# Patient Record
Sex: MALE | Race: WHITE | URBAN - METROPOLITAN AREA
[De-identification: names, ages, dates, MRNs, and addresses within clinical notes are randomized per-mention and may not be internally consistent; named-entity substitution may affect disease eponyms.]

---

## 2023-05-12 ENCOUNTER — INPATIENT (INPATIENT)
Facility: HOSPITAL | Age: 63
LOS: 0 days | Discharge: HOME | DRG: 247 | End: 2023-05-13
Attending: STUDENT IN AN ORGANIZED HEALTH CARE EDUCATION/TRAINING PROGRAM | Admitting: HOSPITALIST
Payer: COMMERCIAL

## 2023-05-12 VITALS
WEIGHT: 194.01 LBS | HEART RATE: 65 BPM | SYSTOLIC BLOOD PRESSURE: 182 MMHG | OXYGEN SATURATION: 99 % | TEMPERATURE: 98 F | RESPIRATION RATE: 19 BRPM | DIASTOLIC BLOOD PRESSURE: 102 MMHG

## 2023-05-12 DIAGNOSIS — I21.4 NON-ST ELEVATION (NSTEMI) MYOCARDIAL INFARCTION: ICD-10-CM

## 2023-05-12 LAB
ALBUMIN SERPL ELPH-MCNC: 4.6 G/DL — SIGNIFICANT CHANGE UP (ref 3.5–5.2)
ALP SERPL-CCNC: 51 U/L — SIGNIFICANT CHANGE UP (ref 30–115)
ALT FLD-CCNC: 22 U/L — SIGNIFICANT CHANGE UP (ref 0–41)
ANION GAP SERPL CALC-SCNC: 11 MMOL/L — SIGNIFICANT CHANGE UP (ref 7–14)
APTT BLD: 33.2 SEC — SIGNIFICANT CHANGE UP (ref 27–39.2)
AST SERPL-CCNC: 20 U/L — SIGNIFICANT CHANGE UP (ref 0–41)
BASOPHILS # BLD AUTO: 0.02 K/UL — SIGNIFICANT CHANGE UP (ref 0–0.2)
BASOPHILS NFR BLD AUTO: 0.3 % — SIGNIFICANT CHANGE UP (ref 0–1)
BILIRUB SERPL-MCNC: 0.5 MG/DL — SIGNIFICANT CHANGE UP (ref 0.2–1.2)
BLD GP AB SCN SERPL QL: SIGNIFICANT CHANGE UP
BUN SERPL-MCNC: 22 MG/DL — HIGH (ref 10–20)
CALCIUM SERPL-MCNC: 10.3 MG/DL — SIGNIFICANT CHANGE UP (ref 8.4–10.5)
CHLORIDE SERPL-SCNC: 102 MMOL/L — SIGNIFICANT CHANGE UP (ref 98–110)
CO2 SERPL-SCNC: 27 MMOL/L — SIGNIFICANT CHANGE UP (ref 17–32)
CREAT SERPL-MCNC: 1 MG/DL — SIGNIFICANT CHANGE UP (ref 0.7–1.5)
EGFR: 86 ML/MIN/1.73M2 — SIGNIFICANT CHANGE UP
EOSINOPHIL # BLD AUTO: 0.08 K/UL — SIGNIFICANT CHANGE UP (ref 0–0.7)
EOSINOPHIL NFR BLD AUTO: 1.3 % — SIGNIFICANT CHANGE UP (ref 0–8)
GLUCOSE SERPL-MCNC: 110 MG/DL — HIGH (ref 70–99)
HCT VFR BLD CALC: 45.5 % — SIGNIFICANT CHANGE UP (ref 42–52)
HGB BLD-MCNC: 15.3 G/DL — SIGNIFICANT CHANGE UP (ref 14–18)
IMM GRANULOCYTES NFR BLD AUTO: 0.3 % — SIGNIFICANT CHANGE UP (ref 0.1–0.3)
INR BLD: 0.93 RATIO — SIGNIFICANT CHANGE UP (ref 0.65–1.3)
LIDOCAIN IGE QN: 26 U/L — SIGNIFICANT CHANGE UP (ref 7–60)
LYMPHOCYTES # BLD AUTO: 0.98 K/UL — LOW (ref 1.2–3.4)
LYMPHOCYTES # BLD AUTO: 16.3 % — LOW (ref 20.5–51.1)
MAGNESIUM SERPL-MCNC: 2.2 MG/DL — SIGNIFICANT CHANGE UP (ref 1.8–2.4)
MCHC RBC-ENTMCNC: 31.5 PG — HIGH (ref 27–31)
MCHC RBC-ENTMCNC: 33.6 G/DL — SIGNIFICANT CHANGE UP (ref 32–37)
MCV RBC AUTO: 93.6 FL — SIGNIFICANT CHANGE UP (ref 80–94)
MONOCYTES # BLD AUTO: 0.45 K/UL — SIGNIFICANT CHANGE UP (ref 0.1–0.6)
MONOCYTES NFR BLD AUTO: 7.5 % — SIGNIFICANT CHANGE UP (ref 1.7–9.3)
NEUTROPHILS # BLD AUTO: 4.45 K/UL — SIGNIFICANT CHANGE UP (ref 1.4–6.5)
NEUTROPHILS NFR BLD AUTO: 74.3 % — SIGNIFICANT CHANGE UP (ref 42.2–75.2)
NRBC # BLD: 0 /100 WBCS — SIGNIFICANT CHANGE UP (ref 0–0)
PLATELET # BLD AUTO: 240 K/UL — SIGNIFICANT CHANGE UP (ref 130–400)
PMV BLD: 10.7 FL — HIGH (ref 7.4–10.4)
POTASSIUM SERPL-MCNC: 5.4 MMOL/L — HIGH (ref 3.5–5)
POTASSIUM SERPL-SCNC: 5.4 MMOL/L — HIGH (ref 3.5–5)
PROT SERPL-MCNC: 7.2 G/DL — SIGNIFICANT CHANGE UP (ref 6–8)
PROTHROM AB SERPL-ACNC: 10.6 SEC — SIGNIFICANT CHANGE UP (ref 9.95–12.87)
RBC # BLD: 4.86 M/UL — SIGNIFICANT CHANGE UP (ref 4.7–6.1)
RBC # FLD: 12 % — SIGNIFICANT CHANGE UP (ref 11.5–14.5)
SODIUM SERPL-SCNC: 140 MMOL/L — SIGNIFICANT CHANGE UP (ref 135–146)
TROPONIN T SERPL-MCNC: 0.03 NG/ML — CRITICAL HIGH
TROPONIN T SERPL-MCNC: 0.07 NG/ML — CRITICAL HIGH
WBC # BLD: 6 K/UL — SIGNIFICANT CHANGE UP (ref 4.8–10.8)
WBC # FLD AUTO: 6 K/UL — SIGNIFICANT CHANGE UP (ref 4.8–10.8)

## 2023-05-12 PROCEDURE — 83735 ASSAY OF MAGNESIUM: CPT

## 2023-05-12 PROCEDURE — 93458 L HRT ARTERY/VENTRICLE ANGIO: CPT | Mod: 59

## 2023-05-12 PROCEDURE — 85025 COMPLETE CBC W/AUTO DIFF WBC: CPT

## 2023-05-12 PROCEDURE — 80053 COMPREHEN METABOLIC PANEL: CPT

## 2023-05-12 PROCEDURE — 99285 EMERGENCY DEPT VISIT HI MDM: CPT

## 2023-05-12 PROCEDURE — C1769: CPT

## 2023-05-12 PROCEDURE — C1725: CPT

## 2023-05-12 PROCEDURE — C1761: CPT

## 2023-05-12 PROCEDURE — 93306 TTE W/DOPPLER COMPLETE: CPT

## 2023-05-12 PROCEDURE — 92978 ENDOLUMINL IVUS OCT C 1ST: CPT | Mod: LD

## 2023-05-12 PROCEDURE — 93005 ELECTROCARDIOGRAM TRACING: CPT

## 2023-05-12 PROCEDURE — 85730 THROMBOPLASTIN TIME PARTIAL: CPT

## 2023-05-12 PROCEDURE — 86803 HEPATITIS C AB TEST: CPT

## 2023-05-12 PROCEDURE — 93010 ELECTROCARDIOGRAM REPORT: CPT | Mod: 77

## 2023-05-12 PROCEDURE — 86850 RBC ANTIBODY SCREEN: CPT

## 2023-05-12 PROCEDURE — C9600: CPT | Mod: RC

## 2023-05-12 PROCEDURE — 0715T: CPT

## 2023-05-12 PROCEDURE — 71045 X-RAY EXAM CHEST 1 VIEW: CPT | Mod: 26

## 2023-05-12 PROCEDURE — 84443 ASSAY THYROID STIM HORMONE: CPT

## 2023-05-12 PROCEDURE — C1874: CPT

## 2023-05-12 PROCEDURE — 36415 COLL VENOUS BLD VENIPUNCTURE: CPT

## 2023-05-12 PROCEDURE — C1887: CPT

## 2023-05-12 PROCEDURE — 86901 BLOOD TYPING SEROLOGIC RH(D): CPT

## 2023-05-12 PROCEDURE — C1753: CPT

## 2023-05-12 PROCEDURE — 83036 HEMOGLOBIN GLYCOSYLATED A1C: CPT

## 2023-05-12 PROCEDURE — C1894: CPT

## 2023-05-12 PROCEDURE — 92979 ENDOLUMINL IVUS OCT C EA: CPT | Mod: RC

## 2023-05-12 PROCEDURE — 80061 LIPID PANEL: CPT

## 2023-05-12 PROCEDURE — 85610 PROTHROMBIN TIME: CPT

## 2023-05-12 PROCEDURE — 86900 BLOOD TYPING SEROLOGIC ABO: CPT

## 2023-05-12 RX ORDER — TICAGRELOR 90 MG/1
180 TABLET ORAL ONCE
Refills: 0 | Status: COMPLETED | OUTPATIENT
Start: 2023-05-12 | End: 2023-05-12

## 2023-05-12 RX ORDER — SODIUM CHLORIDE 9 MG/ML
1000 INJECTION INTRAMUSCULAR; INTRAVENOUS; SUBCUTANEOUS
Refills: 0 | Status: DISCONTINUED | OUTPATIENT
Start: 2023-05-12 | End: 2023-05-13

## 2023-05-12 RX ORDER — ASPIRIN/CALCIUM CARB/MAGNESIUM 324 MG
324 TABLET ORAL ONCE
Refills: 0 | Status: COMPLETED | OUTPATIENT
Start: 2023-05-12 | End: 2023-05-12

## 2023-05-12 RX ORDER — CLOPIDOGREL BISULFATE 75 MG/1
75 TABLET, FILM COATED ORAL DAILY
Refills: 0 | Status: DISCONTINUED | OUTPATIENT
Start: 2023-05-13 | End: 2023-05-13

## 2023-05-12 RX ORDER — ATORVASTATIN CALCIUM 80 MG/1
80 TABLET, FILM COATED ORAL AT BEDTIME
Refills: 0 | Status: DISCONTINUED | OUTPATIENT
Start: 2023-05-12 | End: 2023-05-13

## 2023-05-12 RX ORDER — ASPIRIN/CALCIUM CARB/MAGNESIUM 324 MG
81 TABLET ORAL DAILY
Refills: 0 | Status: DISCONTINUED | OUTPATIENT
Start: 2023-05-13 | End: 2023-05-13

## 2023-05-12 RX ORDER — LOSARTAN POTASSIUM 100 MG/1
25 TABLET, FILM COATED ORAL DAILY
Refills: 0 | Status: DISCONTINUED | OUTPATIENT
Start: 2023-05-12 | End: 2023-05-13

## 2023-05-12 RX ORDER — PANTOPRAZOLE SODIUM 20 MG/1
40 TABLET, DELAYED RELEASE ORAL
Refills: 0 | Status: DISCONTINUED | OUTPATIENT
Start: 2023-05-13 | End: 2023-05-13

## 2023-05-12 RX ORDER — SODIUM CHLORIDE 9 MG/ML
1000 INJECTION, SOLUTION INTRAVENOUS ONCE
Refills: 0 | Status: COMPLETED | OUTPATIENT
Start: 2023-05-12 | End: 2023-05-12

## 2023-05-12 RX ORDER — CHLORHEXIDINE GLUCONATE 213 G/1000ML
1 SOLUTION TOPICAL
Refills: 0 | Status: DISCONTINUED | OUTPATIENT
Start: 2023-05-12 | End: 2023-05-13

## 2023-05-12 RX ADMIN — SODIUM CHLORIDE 1000 MILLILITER(S): 9 INJECTION, SOLUTION INTRAVENOUS at 13:54

## 2023-05-12 RX ADMIN — SODIUM CHLORIDE 100 MILLILITER(S): 9 INJECTION INTRAMUSCULAR; INTRAVENOUS; SUBCUTANEOUS at 20:16

## 2023-05-12 RX ADMIN — ATORVASTATIN CALCIUM 80 MILLIGRAM(S): 80 TABLET, FILM COATED ORAL at 22:40

## 2023-05-12 RX ADMIN — TICAGRELOR 90 MILLIGRAM(S): 90 TABLET ORAL at 14:00

## 2023-05-12 RX ADMIN — Medication 324 MILLIGRAM(S): at 11:35

## 2023-05-12 NOTE — H&P ADULT - ASSESSMENT
62-year-old male with past medical history of hyperlipidemia presenting for palpitations that woke him up at 2 AM followed by constant nonradiating nonexertional retrosternal twisting chest pain.     Labs notable for: K 5.4, troponin 0.03 > 0.07  CXR showed mild bilateral congestion  Initial ECG showed NSR. Repeat ECG after 2nd troponin showed TWIs in V1-V3.      #NSTEMI likely type I    #HLD      #Misc:   63 yo M, with PMHx of HLD, presents for chest pain    #NSTEMI  #HLD  troponin 0.03>0.07  ECG initially NSR with TWI in V1-V3 in repeat ECG  s/p ASA and brilinta load in the ED  planned for cardiac cath today with Dr. Vaughn  - check a1c, lipid panel, tsh  - check echo  - Follow up post cath recs  - continue with ASA 81mg and plavix 75mg daily  - start lipitor 80mg  - start metoprolol 25mg BID    #hyperkalemia  - monitor BMP    #Misc:  - DVT ppx: lovenox  - GI ppx: protonix  - Diet: NPO pending cath. DASH diet after  - Activity: bedrest for now pending cath  - Dispo: med/tele   63 yo M, with PMHx of HLD, nonsmoker, social drinker (occasional wine) presented for chest pain and palpitations that woke up early in the morning at 2AM.    #NSTEMI  #HLD  troponin 0.03>0.07  ECG initially NSR with TWI in V1-V3 in repeat ECG  s/p ASA and brilinta load in the ED  planned for cardiac cath today with Dr. Vaughn  not on any home statins  - check a1c, lipid panel, TSH  - check echo  - continue with ASA 81mg and plavix 75mg daily  - start lipitor 80mg  - follow up post cath recs    #hyperkalemia  - monitor BMP    #Misc:  - DVT ppx: lovenox  - GI ppx: protonix  - Diet: NPO pending cath. DASH diet after  - Activity: bedrest for now pending cath  - Dispo: med/tele   63 yo M, with PMHx of HLD, nonsmoker, social drinker (occasional wine) presented for chest pain and palpitations that woke up early in the morning at 2AM.    #NSTEMI  #HLD  troponin 0.03>0.07  ECG initially NSR with TWI in V1-V3 in repeat ECG  s/p ASA and brilinta load in the ED  planned for cardiac cath today with Dr. Vaughn  not on any home statins  - check a1c, lipid panel, TSH  - check echo  - continue with ASA 81mg and plavix 75mg daily  - start lipitor 80mg  - likely will start BB and ACEi tomorrow  - follow up post cath recs    #hyperkalemia  - monitor BMP    #Misc:  - DVT ppx: lovenox  - GI ppx: protonix  - Diet: NPO pending cath. DASH diet after  - Activity: bedrest for now pending cath  - Dispo: med/tele   63 yo M, with PMHx of HLD, nonsmoker, social drinker (occasional wine) presented for chest pain and palpitations that woke up early in the morning at 2AM.    #NSTEMI  #HLD  troponin 0.03>0.07  ECG initially NSR with TWI in V1-V3 in repeat ECG  s/p ASA and brilinta load in the ED  planned for cardiac cath today with Dr. Vaughn  not on any home statins  - check a1c, lipid panel, TSH  - check echo  - continue with ASA 81mg and plavix 75mg daily  - start lipitor 80mg  - likely will start BB and ACEi (if persistently elevated BP) tomorrow  - follow up post cath recs    #hyperkalemia  - monitor BMP    #Misc:  - DVT ppx: lovenox  - GI ppx: protonix  - Diet: NPO pending cath. DASH diet after  - Activity: bedrest for now pending cath  - Dispo: med/tele

## 2023-05-12 NOTE — ED ADULT TRIAGE NOTE - CHIEF COMPLAINT QUOTE
Pt came c/o midsternal chest pain radiating to the left arm, started last night, also had 2 episodes of vomiting.

## 2023-05-12 NOTE — ED PROVIDER NOTE - PROGRESS NOTE DETAILS
BK: Patient with NSTEMI, cardiology telemetry consulted. BK: Discussed case with Dr. Vaughn. Will admit to [facility] for cath this afternoon. BK: Patient's repeat EKG shows increased elevations in II, III, aVF as well as new TWI in leads V2-4. Patient reassessed and resting comfortably. Denies chest pain at this time. Dr. Vaughn made aware and recommended adding 180 of Brilinta. Plan is still to admit med [facility] for cath today, upgrade discussed and decided against this. After initial trop cardiology consulted. Pt was referred to Dr. Vaughn who I contacted and was aware of the pt. Remained CP free. Repeat top increased and EKG changed. Pt remains without any CP. Pt updtaed on results. Dr. Vaughn updated and he has plans to cath pt this afternoon.   I just spoke with the patient and remains CP free. Will place pads. Pt on monitor.

## 2023-05-12 NOTE — H&P ADULT - NSHPLABSRESULTS_GEN_ALL_CORE
15.3   6.00  )-----------( 240      ( 12 May 2023 08:05 )             45.5       05-12    140  |  102  |  22<H>  ----------------------------<  110<H>  5.4<H>   |  27  |  1.0    Ca    10.3      12 May 2023 08:05  Mg     2.2     05-12    TPro  7.2  /  Alb  4.6  /  TBili  0.5  /  DBili  x   /  AST  20  /  ALT  22  /  AlkPhos  51  05-12                  PT/INR - ( 12 May 2023 13:00 )   PT: 10.60 sec;   INR: 0.93 ratio         PTT - ( 12 May 2023 13:00 )  PTT:33.2 sec    Lactate Trend      CARDIAC MARKERS ( 12 May 2023 11:20 )  x     / 0.07 ng/mL / x     / x     / x      CARDIAC MARKERS ( 12 May 2023 08:05 )  x     / 0.03 ng/mL / x     / x     / x

## 2023-05-12 NOTE — H&P ADULT - NSHPREVIEWOFSYSTEMS_GEN_ALL_CORE
REVIEW OF SYSTEMS:    CONSTITUTIONAL: No weakness, fevers or chills  EYES/ENT: No visual changes;  No vertigo or throat pain   NECK: No pain or stiffness  RESPIRATORY: No cough, wheezing, hemoptysis; No shortness of breath  CARDIOVASCULAR: + chest pain, palpitations  GASTROINTESTINAL: No abdominal or epigastric pain. No nausea, vomiting, or hematemesis; No diarrhea or constipation. No melena or hematochezia.  GENITOURINARY: No dysuria, frequency or hematuria  NEUROLOGICAL: No numbness or weakness  SKIN: No itching, rashes

## 2023-05-12 NOTE — CHART NOTE - NSCHARTNOTEFT_GEN_A_CORE
PRE-OP DIAGNOSIS:    NSTEMI    PROCEDURE:     [x] Coronary Angiogram     [x] LHC     [x] LVG     [] RHC     [x] Intervention (see below)         PHYSICIAN:  Dr. Vaughn    ASSISTANT:  Dr. Quinn       PROCEDURE DESCRIPTION:     Consent:      [x] Patient     [] Family Member     []  Used        Anesthesia:     [] General     [x] Sedation     [x] Local        Access & Closure:     [x] 7 Fr right Radial Artery (Vasc band)    [] Fr Femoral Artery     [] Fr Femoral Vein     [] Fr Brachial Vein       IV Contrast: 200 mL        Intervention:   - Successful IVUS guided PCI of prox LAD with balloon angioplasty, and placement of one drug eluting stent  - Successful IVUS guided PCI of mid RCA with balloon angioplasty and placement of one drug eluting stent    Implants:   - 3.5 x 32 mm Synergy IRVING x 1 in prox LAD  - 4.0 x 32 mm Synergy IRVING x 1 in mid RCA      FINDINGS:     Coronary Dominance:  right dominant    LM: minor irregularities    LAD: 99% thrombotic prox LAD stenosis, s/p PCI    CX: mild disease    RCA: 90% calcified stenosis of mid RCA, s/p PCI         LVEDP: 20 mmHg     EF: 55%        ESTIMATED BLOOD LOSS: < 10 mL        CONDITION:     [x] Good     [] Fair     [] Critical        SPECIMEN REMOVED: N/A       POST-OP DIAGNOSIS:      [] Normal Coronary Angiogram     [] Mild Coronary Artery Disease (< 50% stenosis)     [x] 2 Vessel Coronary Artery Disease (prox LAD, mid RCA)       PLAN OF CARE:     [x] Return to In-patient bed     [x] Medications:   - ASA, Brilinta, statin, beta-blocker    [x] IV Fluids: NS@100cc/hr x 8 hours PRE-OP DIAGNOSIS:    NSTEMI    PROCEDURE:     [x] Coronary Angiogram     [x] LHC     [x] LVG     [] RHC     [x] Intervention (see below)         PHYSICIAN:  Dr. Vaughn    ASSISTANT:  Dr. Quinn       PROCEDURE DESCRIPTION:     Consent:      [x] Patient     [] Family Member     []  Used        Anesthesia:     [] General     [x] Sedation     [x] Local        Access & Closure:     [x] 7 Fr right Radial Artery (Vasc band)    [] Fr Femoral Artery     [] Fr Femoral Vein     [] Fr Brachial Vein       IV Contrast: 200 mL        Intervention:   - Successful IVUS guided PCI of prox LAD with balloon angioplasty, and placement of one drug eluting stent  - Successful IVUS guided PCI of mid RCA with balloon angioplasty, Intravascular shockwave lithotripsy, and placement of one drug eluting stent    Implants:   - 3.5 x 32 mm Synergy IRVING x 1 in prox LAD  - 4.0 x 32 mm Synergy IRVING x 1 in mid RCA      FINDINGS:     Coronary Dominance:  right dominant    LM: minor irregularities    LAD: 99% thrombotic prox LAD stenosis, s/p PCI    CX: mild disease    RCA: 90% calcified stenosis of mid RCA, s/p PCI         LVEDP: 20 mmHg     EF: 55%        ESTIMATED BLOOD LOSS: < 10 mL        CONDITION:     [x] Good     [] Fair     [] Critical        SPECIMEN REMOVED: N/A       POST-OP DIAGNOSIS:      [] Normal Coronary Angiogram     [] Mild Coronary Artery Disease (< 50% stenosis)     [x] 2 Vessel Coronary Artery Disease (prox LAD, mid RCA)       PLAN OF CARE:     [x] Return to In-patient bed     [x] Medications:   - ASA, Brilinta, statin, beta-blocker    [x] IV Fluids: NS@100cc/hr x 8 hours

## 2023-05-12 NOTE — ED ADULT NURSE NOTE - NSFALLUNIVINTERV_ED_ALL_ED
Bed/Stretcher in lowest position, wheels locked, appropriate side rails in place/Call bell, personal items and telephone in reach/Instruct patient to call for assistance before getting out of bed/chair/stretcher/Non-slip footwear applied when patient is off stretcher/[place] to call system/Physically safe environment - no spills, clutter or unnecessary equipment/Purposeful proactive rounding/Room/bathroom lighting operational, light cord in reach

## 2023-05-12 NOTE — CONSULT NOTE ADULT - SUBJECTIVE AND OBJECTIVE BOX
Date of Admission: 5/12/23    CHIEF COMPLAINT: chest pain    HISTORY OF PRESENT ILLNESS: 62yMale with PMH below presented to the hospital for chest pain that started at 430 this morning. felt his Heart racing when it woke him from sleep. Had some epigastric pain afterwards and threw up. Had similar pain this morning and went to the ER. Here he has some ST changes consistent with ischemia, elevated cardiac biomarkers. Now chest pain free.     PAST MEDICAL & SURGICAL HISTORY:    DLD    FAMILY HISTORY:  [x] no pertinent family history of premature cardiovascular disease in first degree relatives.  Mother:   Father:   Siblings:     SOCIAL HISTORY:    [ x Non-smoker  [ ] Smoker  [ ] Alcohol    Allergies    No Known Allergies    Intolerances    	    REVIEW OF SYSTEMS:  CONSTITUTIONAL: No fever, weight loss, or fatigue  CARDIOLOGY: see HPI  RESPIRATORY: see HPI  PSYCHOLOGICAL: NO weakness, no focal deficits to report.  ENDOCRINOLOGICAL: no recent change in diabetic medications.   GI: no BRBPR, no N,V,diarrhea.    PSYCHIATRY: normal mood and affect  HEENT: no nasal discharge, no ecchymosis  SKIN: no ecchymosis, no breakdown  MUSCULOSKELETAL: Full range of motion x4.     PHYSICAL EXAM:  T(C): 36.9 (05-12-23 @ 13:50), Max: 36.9 (05-12-23 @ 13:50)  HR: 60 (05-12-23 @ 13:50) (60 - 65)  BP: 172/94 (05-12-23 @ 13:50) (160/62 - 182/102)  RR: 20 (05-12-23 @ 13:50) (18 - 20)  SpO2: 98% (05-12-23 @ 13:50) (98% - 99%)  Wt(kg): --  I&O's Summary      General Appearance: well appearing, normal for age and gender. 	  Neck: normal JVP, no bruit.   Eyes: No xanthomalasia, Extra Ocular muscles intact.   Cardiovascular: regular rate and rhythm S1 S2, No JVD, No murmurs, No edema  Respiratory: Lungs clear to auscultation	  Psychiatry: Alert and oriented x 3, Mood & affect appropriate  Gastrointestinal:  Soft, Non-tender  Skin/Integumen: No rashes, No ecchymoses, No cyanosis	  Neurologic: Non-focal  Musculoskeletal/ extremities: Normal range of motion, No clubbing, cyanosis or edema  Vascular: Peripheral pulses palpable 2+ bilaterally    LABS:	 	                          15.3   6.00  )-----------( 240      ( 12 May 2023 08:05 )             45.5     05-12    140  |  102  |  22<H>  ----------------------------<  110<H>  5.4<H>   |  27  |  1.0    Ca    10.3      12 May 2023 08:05  Mg     2.2     05-12    TPro  7.2  /  Alb  4.6  /  TBili  0.5  /  DBili  x   /  AST  20  /  ALT  22  /  AlkPhos  51  05-12    CARDIAC MARKERS ( 12 May 2023 11:20 )  x     / 0.07 ng/mL / x     / x     / x      CARDIAC MARKERS ( 12 May 2023 08:05 )  x     / 0.03 ng/mL / x     / x     / x          PT/INR - ( 12 May 2023 13:00 )   PT: 10.60 sec;   INR: 0.93 ratio         PTT - ( 12 May 2023 13:00 )  PTT:33.2 sec    CARDIAC MARKERS:            TELEMETRY EVENTS: 	    ECG:  Anterolateral ST changes	  RADIOLOGY:  OTHER: 	    PREVIOUS DIAGNOSTIC TESTING:    [ ] Echocardiogram:  [ ]  Catheterization:  [ ] Stress Test:  	  	    Home Medications:    MEDICATIONS  (STANDING):  atorvastatin 80 milliGRAM(s) Oral at bedtime    MEDICATIONS  (PRN):

## 2023-05-12 NOTE — ED PROVIDER NOTE - CLINICAL SUMMARY MEDICAL DECISION MAKING FREE TEXT BOX
89 yo with CP and palpitations +n/c. In ED asx. Trop + and had EKG changes. pt is admitted to tele and scheduled for cath today,.

## 2023-05-12 NOTE — CHART NOTE - NSCHARTNOTEFT_GEN_A_CORE
s/p PCI/Intervention:   - Successful IVUS guided PCI of prox LAD with balloon angioplasty, and placement of one drug eluting stent  - Successful IVUS guided PCI of mid RCA with balloon angioplasty and placement of one drug eluting stent    -pt received Brilinta in the room 180mg POx1, post cath Plavix 300mg POx1, and then Plavix 75mg PO daily   -30 days supply of ASA/Plavix given to the patient to take home  -bradycardic in the room, HR down to 40s, hold BB for now  -continue statin  -added Losartan 25mg PO daily   -d/w Dr. Vaughn

## 2023-05-12 NOTE — CONSULT NOTE ADULT - ASSESSMENT
NSTEMI  DLD    - had lengthy discussion regarding Rx and statin therapy after (was hesitant to take this previously and was taking garlic, fish oil, etc)  - NPO for cath today, will need statin thereafter  - already given aspirin and brilinta. will make final antiplatelet decision after coronary anatomy defined  - 2d echo  - lipid profile  - check a1c  - further recommendations after catheterization.

## 2023-05-12 NOTE — H&P ADULT - NSHPPHYSICALEXAM_GEN_ALL_CORE
T(C): 36.9 (05-12-23 @ 13:50), Max: 36.9 (05-12-23 @ 13:50)  HR: 60 (05-12-23 @ 13:50) (60 - 65)  BP: 172/94 (05-12-23 @ 13:50) (160/62 - 182/102)  RR: 20 (05-12-23 @ 13:50) (18 - 20)  SpO2: 98% (05-12-23 @ 13:50) (98% - 99%)    PHYSICAL EXAM:  GENERAL: patient appears well, no acute distress, appropriate, pleasant  EYES: sclera clear, no exudates  ENMT: oropharynx clear without erythema, no exudates, moist mucous membranes  NECK: supple, soft, no thyromegaly noted  LUNGS: good air entry bilaterally, clear to auscultation, symmetric breath sounds, no wheezing or rhonchi appreciated  HEART: soft S1/S2, regular rate and rhythm, no murmurs noted, no lower extremity edema  GASTROINTESTINAL: abdomen is soft, nontender, nondistended, normoactive bowel sounds, no palpable masses  INTEGUMENT: good skin turgor, no lesions noted  MUSCULOSKELETAL: no clubbing or cyanosis, no obvious deformity  NEUROLOGIC: awake, alert, oriented x3, good muscle tone in 4 extremities, no obvious sensory deficits  PSYCHIATRIC: mood is good, affect is congruent, linear and logical thought process  HEME/LYMPH: no palpable supraclavicular nodules, no obvious ecchymosis or petechiae

## 2023-05-12 NOTE — ED PROVIDER NOTE - OBJECTIVE STATEMENT
62-year-old male with past medical history of hyperlipidemia presenting for palpitations that woke him up at 2 AM followed by constant nonradiating nonexertional retrosternal twisting chest pain.  Patient states he then had 3 episodes of nonbilious nonbloody vomiting.  Denies nausea right now.  Patient states he has had this pain before 1 week ago and self resolved.  Patient states that he has had a history of multiple stress test the last few years ago which were normal but does not have a cardiologist.  Denies headache vision changes dizziness fever cold or flu symptoms shortness of breath diarrhea dysuria hematuria melena hematochezia.  Patient states he had 2 glasses of wine last night but normally only has a few glasses per week.

## 2023-05-12 NOTE — CHART NOTE - NSCHARTNOTEFT_GEN_A_CORE
PREOPERATIVE DAY OF PROCEDURE EVALUATION:  I have personally seen and examined the patient.  I agree with the history and physical which I have reviewed and noted any changes below.  (Signed electronically by __________)  05-12-23 @ 14:21      Cath Bleeding Risk: 1.1%    Prehydration: LR 1L bolus in ER    c/c: presenting for palpitations that woke him up at 2 AM followed by constant non-radiating nonexertional retrosternal twisting chest pain.   - ASA/Brilinta loading given in ED  - troponin: 0.07/0.03    PMHx: 62-year-old male with past medical history of hyperlipidemia      Right luigi test: positive

## 2023-05-12 NOTE — ED PROVIDER NOTE - ATTENDING CONTRIBUTION TO CARE
61 yo M hx of HLD here reporting squeezing CP and palliations this morning PTA. Pt had similar CP ~ 1 week prior. No CP in the ED. has hx of negative stress test. Exam as doc. P:labs EKG asa

## 2023-05-12 NOTE — ED ADULT NURSE NOTE - OBJECTIVE STATEMENT
Pt with C/O chest pain ,palpitation  on and off from today at 0200 AM . Pt report n/V , no vomiting at present time Pt with C/O chest pain ,palpitation  on and off from last night and  at 0200 AM . Pt report n/V , no vomiting at present time

## 2023-05-12 NOTE — H&P ADULT - HISTORY OF PRESENT ILLNESS
62-year-old male with past medical history of hyperlipidemia presenting for palpitations that woke him up at 2 AM followed by constant nonradiating nonexertional retrosternal twisting chest pain.  Patient states he then had 3 episodes of nonbilious nonbloody vomiting.  Denies nausea right now.  Patient states he has had this pain before 1 week ago and self resolved.  Patient states that he has had a history of multiple stress test the last few years ago which were normal but does not have a cardiologist.  Denies headache vision changes dizziness fever cold or flu symptoms shortness of breath diarrhea dysuria hematuria melena hematochezia.  Patient states he had 2 glasses of wine last night but normally only has a few glasses per week.    In the ED:  · BP Systolic	 182 mm Hg  · BP Diastolic	 102 mm Hg  · Heart Rate	65 /min  · Respiration Rate (breaths/min)	19 /min  · Temp (F)	98.1 Degrees F  · Temp (C)	36.7 Degrees C  · Temp site	oral  · SpO2 (%)	99 %  · O2 Delivery/Oxygen Delivery Method	room air    Labs notable for: K 5.4, troponin 0.03 > 0.07  CXR showed mild bilateral congestion  Initial ECG showed NSR. Repeat ECG after 2nd troponin showed TWIs in V1-V3.  S/p aspirin and brilinta load in the ED. Admitted for cath with Dr. Vaughn.   61 yo M, with PMHx of HLD, nonsmoker, social drinker (occasional wine) presented for chest pain and palpitations that woke up early in the morning at 2AM. Chest pain was described as a midsternal, "knotlike sensation", nonradiating but did have tingling in his L fingertips associated with nonbilious nonbloody vomiting. Pt reported that throughout the day he had 2 more episodes with this type of chest pain and nausea and vomiting and couldn't go to work because of it so he went to the ED. He reports multiple stress tests in the past that were normal. He otherwise denies any active chest pain, nausea vomiting, headaches, dizziness, SOB, palpitations, abdominal pain, diarrhea, constipation, numbness, tingling or weakness.     In the ED:  · BP Systolic	 182 mm Hg  · BP Diastolic	 102 mm Hg  · Heart Rate	65 /min  · Respiration Rate (breaths/min)	19 /min  · Temp (F)	98.1 Degrees F  · Temp (C)	36.7 Degrees C  · Temp site	oral  · SpO2 (%)	99 %  · O2 Delivery/Oxygen Delivery Method	room air    Labs notable for: K 5.4, troponin 0.03 > 0.07  CXR showed mild bilateral congestion  Initial ECG showed NSR. Repeat ECG after 2nd troponin showed TWIs in V1-V3.  S/p aspirin and brilinta load in the ED. Admitted for cath with Dr. Vaughn.

## 2023-05-12 NOTE — PATIENT PROFILE ADULT - FALL HARM RISK - RISK INTERVENTIONS
Assistance OOB with selected safe patient handling equipment/Assistance with ambulation/Communicate Fall Risk and Risk Factors to all staff, patient, and family/Discuss with provider need for PT consult/Monitor gait and stability/Provide patient with walking aids - walker, cane, crutches/Reinforce activity limits and safety measures with patient and family/Sit up slowly, dangle for a short time, stand at bedside before walking/Use of alarms - bed, chair and/or voice tab/Visual Cue: Yellow wristband/Bed in lowest position, wheels locked, appropriate side rails in place/Call bell, personal items and telephone in reach/Instruct patient to call for assistance before getting out of bed or chair/Non-slip footwear when patient is out of bed/[place] to call system/Physically safe environment - no spills, clutter or unnecessary equipment/Purposeful Proactive Rounding/Room/bathroom lighting operational, light cord in reach

## 2023-05-13 VITALS — HEIGHT: 69 IN | WEIGHT: 203.05 LBS

## 2023-05-13 LAB
A1C WITH ESTIMATED AVERAGE GLUCOSE RESULT: 5.5 % — SIGNIFICANT CHANGE UP (ref 4–5.6)
ALBUMIN SERPL ELPH-MCNC: 3.8 G/DL — SIGNIFICANT CHANGE UP (ref 3.5–5.2)
ALP SERPL-CCNC: 43 U/L — SIGNIFICANT CHANGE UP (ref 30–115)
ALT FLD-CCNC: 25 U/L — SIGNIFICANT CHANGE UP (ref 0–41)
ANION GAP SERPL CALC-SCNC: 9 MMOL/L — SIGNIFICANT CHANGE UP (ref 7–14)
AST SERPL-CCNC: 25 U/L — SIGNIFICANT CHANGE UP (ref 0–41)
BASOPHILS # BLD AUTO: 0.01 K/UL — SIGNIFICANT CHANGE UP (ref 0–0.2)
BASOPHILS NFR BLD AUTO: 0.1 % — SIGNIFICANT CHANGE UP (ref 0–1)
BILIRUB SERPL-MCNC: 1.1 MG/DL — SIGNIFICANT CHANGE UP (ref 0.2–1.2)
BUN SERPL-MCNC: 15 MG/DL — SIGNIFICANT CHANGE UP (ref 10–20)
CALCIUM SERPL-MCNC: 9.1 MG/DL — SIGNIFICANT CHANGE UP (ref 8.4–10.4)
CHLORIDE SERPL-SCNC: 105 MMOL/L — SIGNIFICANT CHANGE UP (ref 98–110)
CHOLEST SERPL-MCNC: 200 MG/DL — HIGH
CO2 SERPL-SCNC: 26 MMOL/L — SIGNIFICANT CHANGE UP (ref 17–32)
CREAT SERPL-MCNC: 1 MG/DL — SIGNIFICANT CHANGE UP (ref 0.7–1.5)
EGFR: 86 ML/MIN/1.73M2 — SIGNIFICANT CHANGE UP
EOSINOPHIL # BLD AUTO: 0.11 K/UL — SIGNIFICANT CHANGE UP (ref 0–0.7)
EOSINOPHIL NFR BLD AUTO: 1.5 % — SIGNIFICANT CHANGE UP (ref 0–8)
ESTIMATED AVERAGE GLUCOSE: 111 MG/DL — SIGNIFICANT CHANGE UP (ref 68–114)
GLUCOSE SERPL-MCNC: 107 MG/DL — HIGH (ref 70–99)
HCT VFR BLD CALC: 41.8 % — LOW (ref 42–52)
HCV AB S/CO SERPL IA: 0.03 COI — SIGNIFICANT CHANGE UP
HCV AB SERPL-IMP: SIGNIFICANT CHANGE UP
HDLC SERPL-MCNC: 30 MG/DL — LOW
HGB BLD-MCNC: 14.1 G/DL — SIGNIFICANT CHANGE UP (ref 14–18)
IMM GRANULOCYTES NFR BLD AUTO: 0.3 % — SIGNIFICANT CHANGE UP (ref 0.1–0.3)
LIPID PNL WITH DIRECT LDL SERPL: 130 MG/DL — HIGH
LYMPHOCYTES # BLD AUTO: 1.13 K/UL — LOW (ref 1.2–3.4)
LYMPHOCYTES # BLD AUTO: 15.1 % — LOW (ref 20.5–51.1)
MAGNESIUM SERPL-MCNC: 1.9 MG/DL — SIGNIFICANT CHANGE UP (ref 1.8–2.4)
MCHC RBC-ENTMCNC: 31.8 PG — HIGH (ref 27–31)
MCHC RBC-ENTMCNC: 33.7 G/DL — SIGNIFICANT CHANGE UP (ref 32–37)
MCV RBC AUTO: 94.1 FL — HIGH (ref 80–94)
MONOCYTES # BLD AUTO: 0.57 K/UL — SIGNIFICANT CHANGE UP (ref 0.1–0.6)
MONOCYTES NFR BLD AUTO: 7.6 % — SIGNIFICANT CHANGE UP (ref 1.7–9.3)
NEUTROPHILS # BLD AUTO: 5.62 K/UL — SIGNIFICANT CHANGE UP (ref 1.4–6.5)
NEUTROPHILS NFR BLD AUTO: 75.4 % — HIGH (ref 42.2–75.2)
NON HDL CHOLESTEROL: 170 MG/DL — HIGH
NRBC # BLD: 0 /100 WBCS — SIGNIFICANT CHANGE UP (ref 0–0)
PLATELET # BLD AUTO: 203 K/UL — SIGNIFICANT CHANGE UP (ref 130–400)
PMV BLD: 10.8 FL — HIGH (ref 7.4–10.4)
POTASSIUM SERPL-MCNC: 4.3 MMOL/L — SIGNIFICANT CHANGE UP (ref 3.5–5)
POTASSIUM SERPL-SCNC: 4.3 MMOL/L — SIGNIFICANT CHANGE UP (ref 3.5–5)
PROT SERPL-MCNC: 6.2 G/DL — SIGNIFICANT CHANGE UP (ref 6–8)
RBC # BLD: 4.44 M/UL — LOW (ref 4.7–6.1)
RBC # FLD: 12.1 % — SIGNIFICANT CHANGE UP (ref 11.5–14.5)
SODIUM SERPL-SCNC: 140 MMOL/L — SIGNIFICANT CHANGE UP (ref 135–146)
TRIGL SERPL-MCNC: 197 MG/DL — HIGH
TSH SERPL-MCNC: 1.31 UIU/ML — SIGNIFICANT CHANGE UP (ref 0.27–4.2)
WBC # BLD: 7.46 K/UL — SIGNIFICANT CHANGE UP (ref 4.8–10.8)
WBC # FLD AUTO: 7.46 K/UL — SIGNIFICANT CHANGE UP (ref 4.8–10.8)

## 2023-05-13 PROCEDURE — 93306 TTE W/DOPPLER COMPLETE: CPT | Mod: 26

## 2023-05-13 PROCEDURE — 93010 ELECTROCARDIOGRAM REPORT: CPT

## 2023-05-13 PROCEDURE — 99238 HOSP IP/OBS DSCHRG MGMT 30/<: CPT

## 2023-05-13 RX ORDER — LOSARTAN POTASSIUM 100 MG/1
1 TABLET, FILM COATED ORAL
Qty: 30 | Refills: 0
Start: 2023-05-13 | End: 2023-06-11

## 2023-05-13 RX ORDER — ASPIRIN/CALCIUM CARB/MAGNESIUM 324 MG
1 TABLET ORAL
Qty: 30 | Refills: 0
Start: 2023-05-13 | End: 2023-06-11

## 2023-05-13 RX ORDER — ATORVASTATIN CALCIUM 80 MG/1
1 TABLET, FILM COATED ORAL
Qty: 30 | Refills: 0
Start: 2023-05-13 | End: 2023-06-11

## 2023-05-13 RX ORDER — ROSUVASTATIN CALCIUM 5 MG/1
1 TABLET ORAL
Qty: 30 | Refills: 0
Start: 2023-05-13 | End: 2023-06-11

## 2023-05-13 RX ORDER — CLOPIDOGREL BISULFATE 75 MG/1
1 TABLET, FILM COATED ORAL
Qty: 30 | Refills: 0
Start: 2023-05-13 | End: 2023-06-11

## 2023-05-13 RX ADMIN — LOSARTAN POTASSIUM 25 MILLIGRAM(S): 100 TABLET, FILM COATED ORAL at 06:13

## 2023-05-13 RX ADMIN — Medication 81 MILLIGRAM(S): at 11:12

## 2023-05-13 RX ADMIN — PANTOPRAZOLE SODIUM 40 MILLIGRAM(S): 20 TABLET, DELAYED RELEASE ORAL at 06:13

## 2023-05-13 RX ADMIN — CLOPIDOGREL BISULFATE 75 MILLIGRAM(S): 75 TABLET, FILM COATED ORAL at 11:11

## 2023-05-13 NOTE — DISCHARGE NOTE PROVIDER - NSDCCPCAREPLAN_GEN_ALL_CORE_FT
PRINCIPAL DISCHARGE DIAGNOSIS  Diagnosis: NSTEMI (non-ST elevation myocardial infarction)  Assessment and Plan of Treatment: A myocardial infarction is when blood flow to the heart muscle is interrupted. This deprives the heart muscle of oxygen, causing tissue damage or tissue death.   Steps to Take:   Diet: Eat a heart-healthy diet, Limit your intake of fat, cholesterol, and sodium. Foods such as ice cream, cheese, baked goods, and red meat are not the best choices. Increase your intake of whole grains, fish, fruits, vegetables, and nuts. Consume alcohol in moderation: one to two drinks per day for men, one drink per day for women. Discuss supplements with your doctor. Your doctor may refer you to dietician to advise you on meal planning. Physical Activity: AHA recommends at least 30 minutes of exercise daily, or at least 3-4 times per week, for patients who have had a heart attack. Your doctor will let you know when you are ready to begin regular exercise.  o Certain painkillers, such as ibuprofen, when taken together with aspirin, may put you at high risk for gastrointestinal bleeding and also reduce the effectiveness of aspirin, Clopidogrel or prasugrel  o Avoid omeprazole or esomeprazole if you take clopidogrel. They may make clopidogrel notwork.    Lifestyle Changes and Prevention Together, you and your doctor will plan proper lifestyle changes that will aid in your recovery. Some things to keep in mind to recover and prevent another heart attack include:  Have your cholesterol checked regularly. Get regular medical check-ups.Control your blood pressure.   Call for Medical Help Right Away if you have symptoms of another heart attack, including: Chest pain, which may feel like a crushing weight on your chest, squeezing, or pressure in the chest  Anxiety, especially feeling a sense of doom or panic without apparent reason Rapid, irregular heartbeat Pain, tingling or numbness in the left shoulder and arm, the neck or jaw, or the right arm Sweating Lightheadedness, weakness, or fainting Shortness of breath

## 2023-05-13 NOTE — DISCHARGE NOTE NURSING/CASE MANAGEMENT/SOCIAL WORK - NSDCPEFALRISK_GEN_ALL_CORE
For information on Fall & Injury Prevention, visit: [email or web address] OR  [email or web address] OR  https://www.cdc.gov/steadi/patient.html

## 2023-05-13 NOTE — DISCHARGE NOTE NURSING/CASE MANAGEMENT/SOCIAL WORK - PATIENT PORTAL LINK FT
You can access the FollowMyHealth Patient Portal offered by [facility] by registering at the following website: [email or web address]. By joining [employer]’s FollowMyHealth portal, you will also be able to view your health information using other applications (apps) compatible with our system.

## 2023-05-13 NOTE — DISCHARGE NOTE PROVIDER - HOSPITAL COURSE
HPI:  61 yo M, with PMHx of HLD, nonsmoker, social drinker (occasional wine) presented for chest pain and palpitations that woke up early in the morning at 2AM. Chest pain was described as a midsternal, "knotlike sensation", nonradiating but did have tingling in his L fingertips associated with nonbilious nonbloody vomiting. Pt reported that throughout the day he had 2 more episodes with this type of chest pain and nausea and vomiting and couldn't go to work because of it so he went to the ED. He reports multiple stress tests in the past that were normal. He otherwise denies any active chest pain, nausea vomiting, headaches, dizziness, SOB, palpitations, abdominal pain, diarrhea, constipation, numbness, tingling or weakness.     In the ED:  · BP Systolic	 182 mm Hg  · BP Diastolic	 102 mm Hg  · Heart Rate	65 /min  · Respiration Rate (breaths/min)	19 /min  · Temp (F)	98.1 Degrees F  · Temp (C)	36.7 Degrees C  · Temp site	oral  · SpO2 (%)	99 %  · O2 Delivery/Oxygen Delivery Method	room air    Labs notable for: K 5.4, troponin 0.03 > 0.07  CXR showed mild bilateral congestion  Initial ECG showed NSR. Repeat ECG after 2nd troponin showed TWIs in V1-V3.  S/p aspirin and brilinta load in the ED. Admitted for cath with Dr. Vaughn.   (12 May 2023 14:47)    Pt was admitted for NSTEMI, s/p PCI/Intervention:   - Successful IVUS guided PCI of prox LAD with balloon angioplasty, and placement of one drug eluting stent  - Successful IVUS guided PCI of mid RCA with balloon angioplasty and placement of one drug eluting stent  -pt received Brilinta in the room 180mg POx1, post cath Plavix 300mg POx1, and then Plavix 75mg PO daily   -30 days supply of ASA/Plavix given to the patient to take home  -bradycardic in the room, HR down to 40s, BB were held   -added Losartan 25mg PO daily     #NSTEMI  #HLD  - Troponin 0.03>0.07  - ECG initially NSR with TWI in V1-V3 in repeat ECG  - s/p ASA and brilinta load in the ED  - s/p PCI with Dr. Vaughn  - Started Atorvastatin 80 QD  - started Losartan 25 QD  - continue with ASA 81mg and plavix 75mg daily  - ECHO:   - BB held as pt HR < 60   - f/u outpatient HPI:  63 yo M, with PMHx of HLD, nonsmoker, social drinker (occasional wine) presented for chest pain and palpitations that woke up early in the morning at 2AM. Chest pain was described as a midsternal, "knotlike sensation", nonradiating but did have tingling in his L fingertips associated with nonbilious nonbloody vomiting. Pt reported that throughout the day he had 2 more episodes with this type of chest pain and nausea and vomiting and couldn't go to work because of it so he went to the ED. He reports multiple stress tests in the past that were normal. He otherwise denies any active chest pain, nausea vomiting, headaches, dizziness, SOB, palpitations, abdominal pain, diarrhea, constipation, numbness, tingling or weakness.     In the ED:  · BP Systolic	 182 mm Hg  · BP Diastolic	 102 mm Hg  · Heart Rate	65 /min  · Respiration Rate (breaths/min)	19 /min  · Temp (F)	98.1 Degrees F  · Temp (C)	36.7 Degrees C  · Temp site	oral  · SpO2 (%)	99 %  · O2 Delivery/Oxygen Delivery Method	room air    Labs notable for: K 5.4, troponin 0.03 > 0.07  CXR showed mild bilateral congestion  Initial ECG showed NSR. Repeat ECG after 2nd troponin showed TWIs in V1-V3.  S/p aspirin and brilinta load in the ED. Admitted for cath with Dr. Vaughn.   (12 May 2023 14:47)    Pt was admitted for NSTEMI, s/p PCI/Intervention:   - Successful IVUS guided PCI of prox LAD with balloon angioplasty, and placement of one drug eluting stent  - Successful IVUS guided PCI of mid RCA with balloon angioplasty and placement of one drug eluting stent  -pt received Brilinta in the room 180mg POx1, post cath Plavix 300mg POx1, and then Plavix 75mg PO daily   -30 days supply of ASA/Plavix given to the patient to take home  -bradycardic in the room, HR down to 40s, BB were held   -added Losartan 25mg PO daily     #NSTEMI  #HLD  - Troponin 0.03>0.07  - ECG initially NSR with TWI in V1-V3 in repeat ECG  - s/p ASA and brilinta load in the ED  - s/p PCI with Dr. Vaughn  - Started Atorvastatin 80 QD -> d/c on crestor 40 HS  - started Losartan 25 QD  - continue with ASA 81mg and plavix 75mg daily  - ECHO:   - BB held as pt HR < 60   - f/u outpatient HPI:  61 yo M, with PMHx of HLD, nonsmoker, social drinker (occasional wine) presented for chest pain and palpitations that woke up early in the morning at 2AM. Chest pain was described as a midsternal, "knotlike sensation", nonradiating but did have tingling in his L fingertips associated with nonbilious nonbloody vomiting. Pt reported that throughout the day he had 2 more episodes with this type of chest pain and nausea and vomiting and couldn't go to work because of it so he went to the ED. He reports multiple stress tests in the past that were normal. He otherwise denies any active chest pain, nausea vomiting, headaches, dizziness, SOB, palpitations, abdominal pain, diarrhea, constipation, numbness, tingling or weakness.     In the ED:  · BP Systolic	 182 mm Hg  · BP Diastolic	 102 mm Hg  · Heart Rate	65 /min  · Respiration Rate (breaths/min)	19 /min  · Temp (F)	98.1 Degrees F  · Temp (C)	36.7 Degrees C  · Temp site	oral  · SpO2 (%)	99 %  · O2 Delivery/Oxygen Delivery Method	room air    Labs notable for: K 5.4, troponin 0.03 > 0.07  CXR showed mild bilateral congestion  Initial ECG showed NSR. Repeat ECG after 2nd troponin showed TWIs in V1-V3.  S/p aspirin and brilinta load in the ED. Admitted for cath with Dr. Vaughn.   (12 May 2023 14:47)    Pt was admitted for NSTEMI, s/p PCI/Intervention:   - Successful IVUS guided PCI of prox LAD with balloon angioplasty, and placement of one drug eluting stent  - Successful IVUS guided PCI of mid RCA with balloon angioplasty and placement of one drug eluting stent  -pt received Brilinta in the room 180mg POx1, post cath Plavix 300mg POx1, and then Plavix 75mg PO daily   -30 days supply of ASA/Plavix given to the patient to take home  -bradycardic in the room, HR down to 40s, BB were held   -added Losartan 25mg PO daily     #NSTEMI  #HLD  - Troponin 0.03>0.07  - ECG initially NSR with TWI in V1-V3 in repeat ECG  - s/p ASA and brilinta load in the ED  - s/p PCI with Dr. Vaughn  - Started Atorvastatin 80 QD -> d/c on crestor 40 HS  - started Losartan 25 QD  - continue with ASA 81mg and plavix 75mg daily  - ECHO: EF 55-60%   - BB held as pt HR < 60   - f/u outpatient HPI:  61 yo M, with PMHx of HLD, presented for chest pain and palpitations that woke up early in the morning at 2AM. Chest pain was described as a midsternal, "knotlike sensation", nonradiating but did have tingling in his L fingertips associated with nonbilious nonbloody vomiting. Pt reported that throughout the day he had 2 more episodes with this type of chest pain and nausea and vomiting and couldn't go to work because of it so he went to the ED. He reports multiple stress tests in the past that were normal. He otherwise denies any active chest pain, nausea vomiting, headaches, dizziness, SOB, palpitations, abdominal pain, diarrhea, constipation, numbness, tingling or weakness.     In the ED:  · BP Systolic	 182 mm Hg  · BP Diastolic	 102 mm Hg  · Heart Rate	65 /min  · Respiration Rate (breaths/min)	19 /min  · Temp (F)	98.1 Degrees F  · Temp (C)	36.7 Degrees C  · Temp site	oral  · SpO2 (%)	99 %  · O2 Delivery/Oxygen Delivery Method	room air    Labs notable for: K 5.4, troponin 0.03 > 0.07  CXR showed mild bilateral congestion  Initial ECG showed NSR. Repeat ECG after 2nd troponin showed TWIs in V1-V3.  S/p aspirin and brilinta load in the ED. Admitted for cath with Dr. Vaughn.   (12 May 2023 14:47)    Pt was admitted for NSTEMI, s/p PCI/Intervention:   - Successful IVUS guided PCI of prox LAD with balloon angioplasty, and placement of one drug eluting stent  - Successful IVUS guided PCI of mid RCA with balloon angioplasty and placement of one drug eluting stent  -pt received Brilinta in the room 180mg POx1, post cath Plavix 300mg POx1, and then Plavix 75mg PO daily   -30 days supply of ASA/Plavix given to the patient to take home  -bradycardic in the room, HR down to 40s, BB were held   -added Losartan 25mg PO daily     #NSTEMI  #HLD  - Troponin 0.03>0.07  - ECG initially NSR with TWI in V1-V3 in repeat ECG  - s/p ASA and brilinta load in the ED  - s/p PCI with Dr. Vaughn  - Started Atorvastatin 80 QD -> d/c on crestor 40 HS  - started Losartan 25 QD  - continue with ASA 81mg and plavix 75mg daily  - ECHO: EF 55-60%   - BB held as pt HR < 60   - f/u outpatient

## 2023-05-13 NOTE — DISCHARGE NOTE PROVIDER - ATTENDING DISCHARGE PHYSICAL EXAMINATION:
I saw and evaluated the patient the day of discharge.  They were admitted for NSTEMI and are s/p PCI of the pLAD and mRCA.  On the day of discharge they are hemodynamically stable, comfortable appearing, and without complaints.  All questions and concerns were addressed.  Patient is stable for discharge and close follow up with cardiology.

## 2023-05-13 NOTE — DISCHARGE NOTE PROVIDER - NSDCMRMEDTOKEN_GEN_ALL_CORE_FT
aspirin 81 mg oral delayed release tablet: 1 tab(s) orally once a day  atorvastatin 80 mg oral tablet: 1 tab(s) orally once a day (at bedtime)  clopidogrel 75 mg oral tablet: 1 tab(s) orally once a day  losartan 25 mg oral tablet: 1 tab(s) orally once a day   aspirin 81 mg oral delayed release tablet: 1 tab(s) orally once a day  clopidogrel 75 mg oral tablet: 1 tab(s) orally once a day  Crestor 40 mg oral tablet: 1 tab(s) orally once a day (at bedtime)  losartan 25 mg oral tablet: 1 tab(s) orally once a day

## 2023-05-13 NOTE — PROGRESS NOTE ADULT - SUBJECTIVE AND OBJECTIVE BOX
Cardiology Follow up NSTEMI, s/p PCI pLAD/mRCA    NATACHA BEAVER   62y Male  PAST MEDICAL & SURGICAL HISTORY:  Hyperlipidemia           HPI:  63 yo M, with PMHx of HLD, nonsmoker, social drinker (occasional wine) presented for chest pain and palpitations that woke up early in the morning at 2AM. Chest pain was described as a midsternal, "knotlike sensation", nonradiating but did have tingling in his L fingertips associated with nonbilious nonbloody vomiting. Pt reported that throughout the day he had 2 more episodes with this type of chest pain and nausea and vomiting and couldn't go to work because of it so he went to the ED. He reports multiple stress tests in the past that were normal. He otherwise denies any active chest pain, nausea vomiting, headaches, dizziness, SOB, palpitations, abdominal pain, diarrhea, constipation, numbness, tingling or weakness.     In the ED:  · BP Systolic	 182 mm Hg  · BP Diastolic	 102 mm Hg  · Heart Rate	65 /min  · Respiration Rate (breaths/min)	19 /min  · Temp (F)	98.1 Degrees F  · Temp (C)	36.7 Degrees C  · Temp site	oral  · SpO2 (%)	99 %  · O2 Delivery/Oxygen Delivery Method	room air    Labs notable for: K 5.4, troponin 0.03 > 0.07  CXR showed mild bilateral congestion  Initial ECG showed NSR. Repeat ECG after 2nd troponin showed TWIs in V1-V3.  S/p aspirin and brilinta load in the ED. Admitted for cath with Dr. Vaughn.   (12 May 2023 14:47)    Allergies    No Known Allergies    Intolerances      Patient without complaints. Pt ambulated without issues/symptoms  Denies CP, SOB, palpitations, or dizziness  S. Harjinder (48-50 BPM)on telemetry overnight    Vital Signs Last 24 Hrs  T(C): 36.7 (13 May 2023 07:38), Max: 36.9 (12 May 2023 13:50)  T(F): 98.1 (13 May 2023 07:38), Max: 98.5 (12 May 2023 13:50)  HR: 74 (13 May 2023 07:38) (50 - 74)  BP: 146/75 (13 May 2023 07:38) (137/76 - 174/86)  BP(mean): 103 (13 May 2023 07:38) (101 - 115)  RR: 19 (12 May 2023 20:53) (18 - 20)  SpO2: 99% (12 May 2023 20:53) (98% - 99%)    Parameters below as of 12 May 2023 13:50  Patient On (Oxygen Delivery Method): room air        MEDICATIONS  (STANDING):  aspirin enteric coated 81 milliGRAM(s) Oral daily  atorvastatin 80 milliGRAM(s) Oral at bedtime  chlorhexidine 2% Cloths 1 Application(s) Topical <User Schedule>  clopidogrel Tablet 75 milliGRAM(s) Oral daily  losartan 25 milliGRAM(s) Oral daily  pantoprazole    Tablet 40 milliGRAM(s) Oral before breakfast  sodium chloride 0.9%. 1000 milliLiter(s) (100 mL/Hr) IV Continuous <Continuous>    MEDICATIONS  (PRN):      REVIEW OF SYSTEMS:          CONSTITUTIONAL: No weakness, fevers or chills          EYES/ENT: No visual changes;  No vertigo or throat pain           NECK: No pain or stiffness          RESPIRATORY: No cough, wheezing, hemoptysis          CARDIOVASCULAR: no pain, no JULES, no palpitations           GASTROINTESTINAL: No abdominal or epigastric pain. No nausea, vomiting, or hematemesis;           GENITOURINARY: No dysuria, frequency or hematuria          NEUROLOGICAL: No numbness or weakness          SKIN: No itching, rashes    PHYSICAL EXAM:           CONSTITUTIONAL: Well-developed; well-nourished; in no acute distress  	SKIN: warm, dry  	HEAD: Normocephalic; atraumatic  	EYES: PERRL.  	ENT: No nasal discharge, airway clear, mucous membranes moist  	NECK: Supple; non tender.  	CARD: +S1, +S2, no murmurs, gallops, or rubs. (Regular) rate and rhythm    	RESP: No wheezes, rales or rhonchi. CTA B/L  	ABD: soft ntnd, + BS x 4 quadrants  	EXT: moves all extremities,  no clubbing, cyanosis or edema  	NEURO: Alert and oriented x3, no focal deficits          PSYCH: Cooperative, appropriate          VASCULAR:  + Rad / + PTs / + DPs          EXTREMITY:  	   Right Radial: Dressing to be removed this afternoon, access site soft, + pulses, no hematoma, no pain, no numbness, no signs and symptoms of infection             ECG: P                                                                                                                2D ECHO: P    LABS:                        14.1   7.46  )-----------( 203      ( 13 May 2023 06:11 )             41.8     05-13    140  |  105  |  15  ----------------------------<  107<H>  4.3   |  26  |  1.0    Ca    9.1      13 May 2023 06:11  Mg     1.9     05-13    TPro  6.2  /  Alb  3.8  /  TBili  1.1  /  DBili  x   /  AST  25  /  ALT  25  /  AlkPhos  43  05-13    CARDIAC MARKERS ( 12 May 2023 11:20 )  x     / 0.07 ng/mL / x     / x     / x      CARDIAC MARKERS ( 12 May 2023 08:05 )  x     / 0.03 ng/mL / x     / x     / x          Magnesium, Serum: 1.9 mg/dL [1.8 - 2.4] (05-13-23 @ 06:11)  LIVER FUNCTIONS - ( 13 May 2023 06:11 )  Alb: 3.8 g/dL / Pro: 6.2 g/dL / ALK PHOS: 43 U/L / ALT: 25 U/L / AST: 25 U/L / GGT: x             A/P:  I discussed the case with Cardiologist Dr. Vaughn and recommend the following:      NSTEMI  S/P PCI/Intervention:   - Successful IVUS guided PCI of prox LAD with balloon angioplasty, and placement of one drug eluting stent  - Successful IVUS guided PCI of mid RCA with balloon angioplasty, Intravascular shockwave lithotripsy, and placement of one drug eluting stent    Implants:   - 3.5 x 32 mm Synergy IRVING x 1 in prox LAD  - 4.0 x 32 mm Synergy IRVING x 1 in mid RCA      FINDINGS:     Coronary Dominance:  right dominant    LM: minor irregularities    LAD: 99% thrombotic prox LAD stenosis, s/p PCI    CX: mild disease    RCA: 90% calcified stenosis of mid RCA, s/p PCI         LVEDP: 20 mmHg     EF: 55%        ESTIMATED BLOOD LOSS: < 10 mL        CONDITION:     [x] Good     [] Fair     [] Critical        SPECIMEN REMOVED: N/A       POST-OP DIAGNOSIS:      [] Normal Coronary Angiogram     [] Mild Coronary Artery Disease (< 50% stenosis)     [x] 2 Vessel Coronary Artery Disease (prox LAD, mid RCA)       PLAN OF CARE:     [x] Return to In-patient bed     [x] Medications:   - ASA, Brilinta, statin, beta-blocker    [x] IV Fluids: NS@100cc/hr x 8 hours.      Electronic Signatures:  Viviana Quinn)  (Signed 12-May-2023 19:48)  	Authored: Note Type, Note  Celso Vaughn)  (Signature Pending)  	Co-Signer: Note Type, Note      Last Updated: 12-May-2023 19:48 by Viviana Quinn)    	     Continue DAPT (Ec Asa 81mg po daily, plavix 75mg po daily), Statin Therapy, ARB                   BB held d/t bradycardia during cath and overnight, consider adding low dose BB as HR allows                   DVT/GI prophylaxis                   Patient given 30 day supply of (EC  Aspirin 81 mg daily and Plavix 75 mg daily ) to take at home                   f/u am ekg/echo results                   OOB-CH, ambulate with assistance                    monitor access site/pulses, R radial dressing to be removed this afternoon                   Patient agreeing to take DAPT for at least one year or as directed by cardiologist                    Pt given instructions on importance of taking antiplatelet medication or risk acute stent thrombosis/death                   Post cath instructions, access site care and activity restrictions reviewed with patient                      Benefits of Cardiac Rehab discussed with patient and all documents sent to [facility]                   Patient instructed to call Cardiac Rehab and make first appointment after first f/u visit with Cardiologist                    Discussed with patient to return to hospital if experience chest pain, shortness breath, dizziness and site bleeding                   Aggressive risk factor modification, diet counseling, smoking cessation discussed with patient                       Anticipate discharge from Interventional Cardiology standpoint if labs/echo/ekg/site WNL and ambulating without symptoms                    Follow up with Cardiology Dr. Vaughn in 1-2 weeks.  Patient instructed to call and make an appointment                      Discharge instructions as follows:                   - Continue medical regimen as prescribed to prevent chest pain                   - Continue dual anti-platelet therapy, beta blocker, ARB, PPI                   - If you are diabetic and taking medication containing Metformin, do not take them for 48 hours after the procedure                   - Instructed to call 911 if chest pain, shortness of breath or bleeding from access site                   - No heavy lifting >10lbs x 1 week                   - No driving x 24 hours                   - No baths, swimming pools x 1 week, may shower                   - Low sodium low fat low cholesterol diet                   - Follow-up with Cardiologist in 1-2 weeks after discharge                                      Cardiology Follow up NSTEMI, s/p PCI pLAD/mRCA    NATACHA BEAVER   62y Male  PAST MEDICAL & SURGICAL HISTORY:  Hyperlipidemia           HPI:  63 yo M, with PMHx of HLD, nonsmoker, social drinker (occasional wine) presented for chest pain and palpitations that woke up early in the morning at 2AM. Chest pain was described as a midsternal, "knotlike sensation", nonradiating but did have tingling in his L fingertips associated with nonbilious nonbloody vomiting. Pt reported that throughout the day he had 2 more episodes with this type of chest pain and nausea and vomiting and couldn't go to work because of it so he went to the ED. He reports multiple stress tests in the past that were normal. He otherwise denies any active chest pain, nausea vomiting, headaches, dizziness, SOB, palpitations, abdominal pain, diarrhea, constipation, numbness, tingling or weakness.     In the ED:  · BP Systolic	 182 mm Hg  · BP Diastolic	 102 mm Hg  · Heart Rate	65 /min  · Respiration Rate (breaths/min)	19 /min  · Temp (F)	98.1 Degrees F  · Temp (C)	36.7 Degrees C  · Temp site	oral  · SpO2 (%)	99 %  · O2 Delivery/Oxygen Delivery Method	room air    Labs notable for: K 5.4, troponin 0.03 > 0.07  CXR showed mild bilateral congestion  Initial ECG showed NSR. Repeat ECG after 2nd troponin showed TWIs in V1-V3.  S/p aspirin and brilinta load in the ED. Admitted for cath with Dr. Vaughn.   (12 May 2023 14:47)    Allergies    No Known Allergies    Intolerances      Patient without complaints. Pt ambulated without issues/symptoms  Denies CP, SOB, palpitations, or dizziness  S. Harjinder (48-50 BPM)on telemetry overnight    Vital Signs Last 24 Hrs  T(C): 36.7 (13 May 2023 07:38), Max: 36.9 (12 May 2023 13:50)  T(F): 98.1 (13 May 2023 07:38), Max: 98.5 (12 May 2023 13:50)  HR: 74 (13 May 2023 07:38) (50 - 74)  BP: 146/75 (13 May 2023 07:38) (137/76 - 174/86)  BP(mean): 103 (13 May 2023 07:38) (101 - 115)  RR: 19 (12 May 2023 20:53) (18 - 20)  SpO2: 99% (12 May 2023 20:53) (98% - 99%)    Parameters below as of 12 May 2023 13:50  Patient On (Oxygen Delivery Method): room air        MEDICATIONS  (STANDING):  aspirin enteric coated 81 milliGRAM(s) Oral daily  atorvastatin 80 milliGRAM(s) Oral at bedtime  chlorhexidine 2% Cloths 1 Application(s) Topical <User Schedule>  clopidogrel Tablet 75 milliGRAM(s) Oral daily  losartan 25 milliGRAM(s) Oral daily  pantoprazole    Tablet 40 milliGRAM(s) Oral before breakfast  sodium chloride 0.9%. 1000 milliLiter(s) (100 mL/Hr) IV Continuous <Continuous>    MEDICATIONS  (PRN):      REVIEW OF SYSTEMS:          CONSTITUTIONAL: No weakness, fevers or chills          EYES/ENT: No visual changes;  No vertigo or throat pain           NECK: No pain or stiffness          RESPIRATORY: No cough, wheezing, hemoptysis          CARDIOVASCULAR: no pain, no JULES, no palpitations           GASTROINTESTINAL: No abdominal or epigastric pain. No nausea, vomiting, or hematemesis;           GENITOURINARY: No dysuria, frequency or hematuria          NEUROLOGICAL: No numbness or weakness          SKIN: No itching, rashes    PHYSICAL EXAM:           CONSTITUTIONAL: Well-developed; well-nourished; in no acute distress  	SKIN: warm, dry  	HEAD: Normocephalic; atraumatic  	EYES: PERRL.  	ENT: No nasal discharge, airway clear, mucous membranes moist  	NECK: Supple; non tender.  	CARD: +S1, +S2, no murmurs, gallops, or rubs. (Regular) rate and rhythm    	RESP: No wheezes, rales or rhonchi. CTA B/L  	ABD: soft ntnd, + BS x 4 quadrants  	EXT: moves all extremities,  no clubbing, cyanosis or edema  	NEURO: Alert and oriented x3, no focal deficits          PSYCH: Cooperative, appropriate          VASCULAR:  + Rad / + PTs / + DPs          EXTREMITY:  	   Right Radial: Dressing to be removed this afternoon, access site soft, + pulses, no hematoma, no pain, no numbness, no signs and symptoms of infection             ECG: P                                                                                                                2D ECHO: P    LABS:                        14.1   7.46  )-----------( 203      ( 13 May 2023 06:11 )             41.8     05-13    140  |  105  |  15  ----------------------------<  107<H>  4.3   |  26  |  1.0    Ca    9.1      13 May 2023 06:11  Mg     1.9     05-13    TPro  6.2  /  Alb  3.8  /  TBili  1.1  /  DBili  x   /  AST  25  /  ALT  25  /  AlkPhos  43  05-13    CARDIAC MARKERS ( 12 May 2023 11:20 )  x     / 0.07 ng/mL / x     / x     / x      CARDIAC MARKERS ( 12 May 2023 08:05 )  x     / 0.03 ng/mL / x     / x     / x          Magnesium, Serum: 1.9 mg/dL [1.8 - 2.4] (05-13-23 @ 06:11)  LIVER FUNCTIONS - ( 13 May 2023 06:11 )  Alb: 3.8 g/dL / Pro: 6.2 g/dL / ALK PHOS: 43 U/L / ALT: 25 U/L / AST: 25 U/L / GGT: x             A/P:  I discussed the case with Cardiologist Dr. Vaughn and recommend the following:      NSTEMI  S/P PCI/Intervention:   - Successful IVUS guided PCI of prox LAD with balloon angioplasty, and placement of one drug eluting stent  - Successful IVUS guided PCI of mid RCA with balloon angioplasty, Intravascular shockwave lithotripsy, and placement of one drug eluting stent    Implants:   - 3.5 x 32 mm Synergy IRVING x 1 in prox LAD  - 4.0 x 32 mm Synergy IRVING x 1 in mid RCA      FINDINGS:     Coronary Dominance:  right dominant    LM: minor irregularities    LAD: 99% thrombotic prox LAD stenosis, s/p PCI    CX: mild disease    RCA: 90% calcified stenosis of mid RCA, s/p PCI         LVEDP: 20 mmHg     EF: 55%        ESTIMATED BLOOD LOSS: < 10 mL        CONDITION:     [x] Good     [] Fair     [] Critical        SPECIMEN REMOVED: N/A       POST-OP DIAGNOSIS:      [] Normal Coronary Angiogram     [] Mild Coronary Artery Disease (< 50% stenosis)     [x] 2 Vessel Coronary Artery Disease (prox LAD, mid RCA)       PLAN OF CARE:     [x] Return to In-patient bed     [x] Medications:   - ASA, Brilinta, statin, beta-blocker    [x] IV Fluids: NS@100cc/hr x 8 hours.      Electronic Signatures:  Viviana Quinn)  (Signed 12-May-2023 19:48)  	Authored: Note Type, Note  Celso Vaughn)  (Signature Pending)  	Co-Signer: Note Type, Note      Last Updated: 12-May-2023 19:48 by Viviana Quinn)    	     Continue DAPT (Ec Asa 81mg po daily, plavix 75mg po daily), Statin Therapy, ARB                   Change Liptor to Crestor 40mg po QHS on d/c                   BB held d/t bradycardia during cath and overnight, will reevaluate as OP per Dr. Vaughn                   DVT/GI prophylaxis                   Patient given 30 day supply of (EC  Aspirin 81 mg daily and Plavix 75 mg daily ) to take at home                   f/u am ekg/echo results                   OOB-CH, ambulate with assistance                    monitor access site/pulses, R radial dressing to be removed this afternoon                   Patient agreeing to take DAPT for at least one year or as directed by cardiologist                    Pt given instructions on importance of taking antiplatelet medication or risk acute stent thrombosis/death                   Post cath instructions, access site care and activity restrictions reviewed with patient                      Benefits of Cardiac Rehab discussed with patient and all documents sent to Cardiac Rehab Center                   Patient instructed to call Cardiac Rehab and make first appointment after first f/u visit with Cardiologist                    Discussed with patient to return to hospital if experience chest pain, shortness breath, dizziness and site bleeding                   Aggressive risk factor modification, diet counseling, smoking cessation discussed with patient                       Anticipate discharge from Interventional Cardiology standpoint if labs/echo/ekg/site WNL and ambulating without symptoms                    Follow up with Cardiology Dr. Vaughn in 1-2 weeks.  Patient instructed to call and make an appointment                      Discharge instructions as follows:                   - Continue medical regimen as prescribed to prevent chest pain                   - Continue dual anti-platelet therapy, beta blocker, ARB, PPI                   - If you are diabetic and taking medication containing Metformin, do not take them for 48 hours after the procedure                   - Instructed to call 911 if chest pain, shortness of breath or bleeding from access site                   - No heavy lifting >10lbs x 1 week                   - No driving x 24 hours                   - No baths, swimming pools x 1 week, may shower                   - Low sodium low fat low cholesterol diet                   - Follow-up with Cardiologist in 1-2 weeks after discharge

## 2023-05-13 NOTE — DISCHARGE NOTE PROVIDER - NSDCFUADDINST_GEN_ALL_CORE_FT
Discharge instructions as follows:                   - Continue medical regimen as prescribed to prevent chest pain                   - Continue dual anti-platelet therapy, beta blocker, ARB, PPI                   - If you are diabetic and taking medication containing Metformin, do not take them for 48 hours after the procedure                   - Instructed to call 911 if chest pain, shortness of breath or bleeding from access site                   - No heavy lifting >10lbs x 1 week                   - No driving x 24 hours                   - No baths, swimming pools x 1 week, may shower                   - Low sodium low fat low cholesterol diet                   - Follow-up with Cardiologist in 1-2 weeks after discharge

## 2023-05-13 NOTE — DISCHARGE NOTE PROVIDER - CARE PROVIDER_API CALL
Celso Vaughn)  Cardiovascular Disease; Internal Medicine; Interventional Cardiology  [address] [place] [address], [place] [address]  Phone: [phone]  Fax: [phone]  Follow Up Time: 2 weeks

## 2023-05-18 DIAGNOSIS — I25.84 CORONARY ATHEROSCLEROSIS DUE TO CALCIFIED CORONARY LESION: ICD-10-CM

## 2023-05-18 DIAGNOSIS — I21.4 NON-ST ELEVATION (NSTEMI) MYOCARDIAL INFARCTION: ICD-10-CM

## 2023-05-18 DIAGNOSIS — E87.5 HYPERKALEMIA: ICD-10-CM

## 2023-05-18 DIAGNOSIS — E78.5 HYPERLIPIDEMIA, UNSPECIFIED: ICD-10-CM

## 2023-05-18 DIAGNOSIS — R00.1 BRADYCARDIA, UNSPECIFIED: ICD-10-CM

## 2023-05-18 DIAGNOSIS — I25.10 ATHEROSCLEROTIC HEART DISEASE OF NATIVE CORONARY ARTERY WITHOUT ANGINA PECTORIS: ICD-10-CM

## 2023-10-02 NOTE — PATIENT PROFILE ADULT - SURGICAL SITE DESCRIPTION
Anesthesia Post-op Note    Patient: Pravin Fraire  Procedure(s) Performed: REDO LUMBAR 3 - 4 LAMINECTOMY AND POSTERIOR LUMBAR FUSION, EMG MONITORING, O-ARM/STEALTH  Anesthesia type: General    Vitals Value Taken Time   Temp 36.1 °C (97 °F) 10/02/23 1520   Pulse 73 10/02/23 1520   Resp 15 10/02/23 1520   SpO2 94 % 10/02/23 1520   /80 10/02/23 1520         Patient Location: PACU Phase 1  Post-op Vital Signs:stable  Level of Consciousness: participates in exam, awake, oriented, alert and answers questions appropriately  Respiratory Status: spontaneous ventilation, unassisted and nasal cannula  Cardiovascular blood pressure returned to baseline  Hydration: euvolemic  Pain Management: adequately controlled and adequately managed  Nausea: None  Airway Patency:patent  Post-op Assessment: awake, alert, appropriately conversant, or baseline, no complications, patient tolerated procedure well and no evidence of recall  Comments: 2 liters NC.      No notable events documented.   s/p cath R radial

## 2025-03-02 PROBLEM — S46.911A RIGHT SHOULDER STRAIN: Status: ACTIVE | Noted: 2025-03-02

## 2025-03-02 PROBLEM — Z00.00 ENCOUNTER FOR PREVENTIVE HEALTH EXAMINATION: Status: ACTIVE | Noted: 2025-03-02

## 2025-07-10 ENCOUNTER — APPOINTMENT (OUTPATIENT)
Dept: ORTHOPEDIC SURGERY | Facility: CLINIC | Age: 65
End: 2025-07-10
Payer: COMMERCIAL

## 2025-07-10 PROCEDURE — 99213 OFFICE O/P EST LOW 20 MIN: CPT

## 2025-07-10 PROCEDURE — 72110 X-RAY EXAM L-2 SPINE 4/>VWS: CPT

## 2025-07-21 NOTE — ED ADULT NURSE NOTE - NS PRO AD NO ADVANCE DIRECTIVE
Medication failed protocol.    Medication: escitalopram (LEXAPRO) 10 MG tablet   Medication Refill Protocol Failed.  Protocol approved due to: data identified in chart review.   Last office visit date: 6/12/25  Next appointment scheduled?: No; Outreach performed, left message for patient to call back.    
No